# Patient Record
Sex: FEMALE | Race: WHITE | NOT HISPANIC OR LATINO | URBAN - METROPOLITAN AREA
[De-identification: names, ages, dates, MRNs, and addresses within clinical notes are randomized per-mention and may not be internally consistent; named-entity substitution may affect disease eponyms.]

---

## 2017-03-14 ENCOUNTER — APPOINTMENT (RX ONLY)
Dept: URBAN - METROPOLITAN AREA CLINIC 304 | Facility: CLINIC | Age: 72
Setting detail: DERMATOLOGY
End: 2017-03-14

## 2017-03-14 DIAGNOSIS — Z41.9 ENCOUNTER FOR PROCEDURE FOR PURPOSES OTHER THAN REMEDYING HEALTH STATE, UNSPECIFIED: ICD-10-CM

## 2017-03-14 PROCEDURE — ? FILLERS

## 2017-03-14 PROCEDURE — ? HYALURONIDASE INJECTION

## 2017-03-14 ASSESSMENT — LOCATION DETAILED DESCRIPTION DERM
LOCATION DETAILED: RIGHT CENTRAL BUCCAL CHEEK
LOCATION DETAILED: RIGHT MEDIAL BUCCAL CHEEK
LOCATION DETAILED: LEFT SUPERIOR CENTRAL BUCCAL CHEEK

## 2017-03-14 ASSESSMENT — LOCATION SIMPLE DESCRIPTION DERM
LOCATION SIMPLE: LEFT CHEEK
LOCATION SIMPLE: RIGHT CHEEK

## 2017-03-14 ASSESSMENT — LOCATION ZONE DERM: LOCATION ZONE: FACE

## 2017-03-14 NOTE — PROCEDURE: HYALURONIDASE INJECTION
Hyaluronidase Preparation: hyaluronidase
Consent: The risks of contour defects and dimpling of the skin were reviewed with the patient prior to the injection.
Treatment Number (Optional): 1
Filler Previously Used (Optional): Restylane Lyft
Administered By (Optional): DAWNA
Total Volume (Ccs): 0.5
Detail Level: Detailed

## 2017-04-04 ENCOUNTER — APPOINTMENT (RX ONLY)
Dept: URBAN - METROPOLITAN AREA CLINIC 304 | Facility: CLINIC | Age: 72
Setting detail: DERMATOLOGY
End: 2017-04-04

## 2017-04-04 DIAGNOSIS — Z41.9 ENCOUNTER FOR PROCEDURE FOR PURPOSES OTHER THAN REMEDYING HEALTH STATE, UNSPECIFIED: ICD-10-CM

## 2017-04-04 PROCEDURE — ? OTHER (COSMETIC)

## 2017-04-04 ASSESSMENT — LOCATION DETAILED DESCRIPTION DERM
LOCATION DETAILED: LEFT SUPERIOR MEDIAL BUCCAL CHEEK
LOCATION DETAILED: LEFT MEDIAL BUCCAL CHEEK
LOCATION DETAILED: RIGHT MEDIAL BUCCAL CHEEK

## 2017-04-04 ASSESSMENT — LOCATION SIMPLE DESCRIPTION DERM
LOCATION SIMPLE: LEFT CHEEK
LOCATION SIMPLE: RIGHT CHEEK

## 2017-04-04 ASSESSMENT — LOCATION ZONE DERM: LOCATION ZONE: FACE

## 2017-06-15 ENCOUNTER — APPOINTMENT (RX ONLY)
Dept: URBAN - METROPOLITAN AREA CLINIC 304 | Facility: CLINIC | Age: 72
Setting detail: DERMATOLOGY
End: 2017-06-15

## 2017-06-15 DIAGNOSIS — Z41.9 ENCOUNTER FOR PROCEDURE FOR PURPOSES OTHER THAN REMEDYING HEALTH STATE, UNSPECIFIED: ICD-10-CM

## 2017-06-15 PROCEDURE — ? OTHER (COSMETIC)

## 2017-06-15 ASSESSMENT — LOCATION DETAILED DESCRIPTION DERM
LOCATION DETAILED: LEFT SUPERIOR CENTRAL BUCCAL CHEEK
LOCATION DETAILED: RIGHT SUPERIOR MEDIAL BUCCAL CHEEK
LOCATION DETAILED: LEFT CENTRAL BUCCAL CHEEK

## 2017-06-15 ASSESSMENT — LOCATION SIMPLE DESCRIPTION DERM
LOCATION SIMPLE: RIGHT CHEEK
LOCATION SIMPLE: LEFT CHEEK

## 2017-06-15 ASSESSMENT — LOCATION ZONE DERM: LOCATION ZONE: FACE

## 2017-06-15 NOTE — PROCEDURE: OTHER (COSMETIC)
Detail Level: Simple
Other (Free Text): Used 0.15cc or her filler that she had remaining. Still has 0.4cc

## 2017-08-15 ENCOUNTER — APPOINTMENT (RX ONLY)
Dept: URBAN - METROPOLITAN AREA CLINIC 304 | Facility: CLINIC | Age: 72
Setting detail: DERMATOLOGY
End: 2017-08-15

## 2017-08-15 DIAGNOSIS — Z41.9 ENCOUNTER FOR PROCEDURE FOR PURPOSES OTHER THAN REMEDYING HEALTH STATE, UNSPECIFIED: ICD-10-CM

## 2017-08-15 PROCEDURE — ? OTHER (COSMETIC)

## 2017-08-15 ASSESSMENT — LOCATION ZONE DERM
LOCATION ZONE: LIP
LOCATION ZONE: FACE

## 2017-08-15 ASSESSMENT — LOCATION DETAILED DESCRIPTION DERM
LOCATION DETAILED: LEFT LOWER CUTANEOUS LIP
LOCATION DETAILED: LEFT SUPERIOR CENTRAL BUCCAL CHEEK

## 2017-08-15 ASSESSMENT — LOCATION SIMPLE DESCRIPTION DERM
LOCATION SIMPLE: LEFT LIP
LOCATION SIMPLE: LEFT CHEEK

## 2017-10-03 ENCOUNTER — APPOINTMENT (RX ONLY)
Dept: URBAN - METROPOLITAN AREA CLINIC 304 | Facility: CLINIC | Age: 72
Setting detail: DERMATOLOGY
End: 2017-10-03

## 2017-10-03 DIAGNOSIS — Z41.9 ENCOUNTER FOR PROCEDURE FOR PURPOSES OTHER THAN REMEDYING HEALTH STATE, UNSPECIFIED: ICD-10-CM

## 2017-10-03 PROCEDURE — ? FILLERS

## 2017-10-03 PROCEDURE — ? HYALURONIDASE INJECTION

## 2017-10-03 ASSESSMENT — LOCATION DETAILED DESCRIPTION DERM
LOCATION DETAILED: LEFT SUPERIOR CENTRAL BUCCAL CHEEK
LOCATION DETAILED: LEFT CENTRAL BUCCAL CHEEK
LOCATION DETAILED: RIGHT MEDIAL BUCCAL CHEEK

## 2017-10-03 ASSESSMENT — LOCATION SIMPLE DESCRIPTION DERM
LOCATION SIMPLE: RIGHT CHEEK
LOCATION SIMPLE: LEFT CHEEK

## 2017-10-03 ASSESSMENT — LOCATION ZONE DERM: LOCATION ZONE: FACE

## 2017-10-03 NOTE — PROCEDURE: FILLERS
Price (Use Numbers Only, No Special Characters Or $): 367 Price (Use Numbers Only, No Special Characters Or $): 454

## 2017-10-03 NOTE — PROCEDURE: HYALURONIDASE INJECTION
Filler Previously Used (Optional): juvederm ultra xc
Total Volume (Ccs): 0.1
Detail Level: Detailed
Treatment Number (Optional): 1
Administered By (Optional): DAWNA
Hyaluronidase Preparation: hyaluronidase
Consent: The risks of contour defects and dimpling of the skin were reviewed with the patient prior to the injection.

## 2018-01-02 ENCOUNTER — APPOINTMENT (RX ONLY)
Dept: URBAN - METROPOLITAN AREA CLINIC 304 | Facility: CLINIC | Age: 73
Setting detail: DERMATOLOGY
End: 2018-01-02

## 2018-01-02 DIAGNOSIS — Z41.9 ENCOUNTER FOR PROCEDURE FOR PURPOSES OTHER THAN REMEDYING HEALTH STATE, UNSPECIFIED: ICD-10-CM

## 2018-01-02 PROCEDURE — ? FILLERS

## 2018-01-02 ASSESSMENT — LOCATION DETAILED DESCRIPTION DERM
LOCATION DETAILED: LEFT SUPERIOR CENTRAL BUCCAL CHEEK
LOCATION DETAILED: RIGHT CENTRAL BUCCAL CHEEK
LOCATION DETAILED: RIGHT MEDIAL BUCCAL CHEEK
LOCATION DETAILED: LEFT CENTRAL BUCCAL CHEEK

## 2018-01-02 ASSESSMENT — LOCATION ZONE DERM: LOCATION ZONE: FACE

## 2018-01-02 ASSESSMENT — LOCATION SIMPLE DESCRIPTION DERM
LOCATION SIMPLE: RIGHT CHEEK
LOCATION SIMPLE: LEFT CHEEK

## 2018-03-20 ENCOUNTER — APPOINTMENT (RX ONLY)
Dept: URBAN - METROPOLITAN AREA CLINIC 304 | Facility: CLINIC | Age: 73
Setting detail: DERMATOLOGY
End: 2018-03-20

## 2018-03-20 DIAGNOSIS — Z41.9 ENCOUNTER FOR PROCEDURE FOR PURPOSES OTHER THAN REMEDYING HEALTH STATE, UNSPECIFIED: ICD-10-CM

## 2018-03-20 PROCEDURE — ? OTHER (COSMETIC)

## 2018-03-20 ASSESSMENT — LOCATION DETAILED DESCRIPTION DERM
LOCATION DETAILED: LEFT SUPERIOR CENTRAL BUCCAL CHEEK
LOCATION DETAILED: LEFT CENTRAL BUCCAL CHEEK
LOCATION DETAILED: RIGHT CENTRAL BUCCAL CHEEK

## 2018-03-20 ASSESSMENT — LOCATION SIMPLE DESCRIPTION DERM
LOCATION SIMPLE: LEFT CHEEK
LOCATION SIMPLE: RIGHT CHEEK

## 2018-03-20 ASSESSMENT — LOCATION ZONE DERM: LOCATION ZONE: FACE

## 2018-03-20 NOTE — PROCEDURE: OTHER (COSMETIC)
Detail Level: Simple
Other (Free Text): Used 0.4cc of her old juvederm. Samples of ovace foam to use qhs as short contact txmt for rosacea.

## 2018-06-26 ENCOUNTER — APPOINTMENT (RX ONLY)
Dept: URBAN - METROPOLITAN AREA CLINIC 304 | Facility: CLINIC | Age: 73
Setting detail: DERMATOLOGY
End: 2018-06-26

## 2018-06-26 DIAGNOSIS — Z41.9 ENCOUNTER FOR PROCEDURE FOR PURPOSES OTHER THAN REMEDYING HEALTH STATE, UNSPECIFIED: ICD-10-CM

## 2018-06-26 PROCEDURE — ? OTHER

## 2018-06-26 ASSESSMENT — LOCATION ZONE DERM: LOCATION ZONE: FACE

## 2018-06-26 ASSESSMENT — LOCATION SIMPLE DESCRIPTION DERM
LOCATION SIMPLE: LEFT CHEEK
LOCATION SIMPLE: RIGHT CHEEK

## 2018-06-26 ASSESSMENT — LOCATION DETAILED DESCRIPTION DERM
LOCATION DETAILED: LEFT CENTRAL BUCCAL CHEEK
LOCATION DETAILED: LEFT MEDIAL BUCCAL CHEEK
LOCATION DETAILED: RIGHT CENTRAL BUCCAL CHEEK

## 2019-01-08 ENCOUNTER — APPOINTMENT (RX ONLY)
Dept: URBAN - METROPOLITAN AREA CLINIC 304 | Facility: CLINIC | Age: 74
Setting detail: DERMATOLOGY
End: 2019-01-08

## 2019-01-08 DIAGNOSIS — Z41.9 ENCOUNTER FOR PROCEDURE FOR PURPOSES OTHER THAN REMEDYING HEALTH STATE, UNSPECIFIED: ICD-10-CM

## 2019-01-08 PROCEDURE — ? OTHER (COSMETIC)

## 2019-01-08 ASSESSMENT — LOCATION DETAILED DESCRIPTION DERM: LOCATION DETAILED: LEFT CENTRAL BUCCAL CHEEK

## 2019-01-08 ASSESSMENT — LOCATION ZONE DERM: LOCATION ZONE: FACE

## 2019-01-08 ASSESSMENT — LOCATION SIMPLE DESCRIPTION DERM: LOCATION SIMPLE: LEFT CHEEK

## 2019-01-08 NOTE — PROCEDURE: OTHER (COSMETIC)
Detail Level: Simple
Price (Use Numbers Only, No Special Characters Or $): 50
Other (Free Text): Used 0.1cc of her juvederm (has 0.3cc remaining)

## 2019-10-22 ENCOUNTER — APPOINTMENT (RX ONLY)
Dept: URBAN - METROPOLITAN AREA CLINIC 304 | Facility: CLINIC | Age: 74
Setting detail: DERMATOLOGY
End: 2019-10-22

## 2019-10-22 DIAGNOSIS — Z41.9 ENCOUNTER FOR PROCEDURE FOR PURPOSES OTHER THAN REMEDYING HEALTH STATE, UNSPECIFIED: ICD-10-CM

## 2019-10-22 PROCEDURE — ? OTHER (COSMETIC)

## 2019-10-22 ASSESSMENT — LOCATION SIMPLE DESCRIPTION DERM
LOCATION SIMPLE: LEFT CHEEK
LOCATION SIMPLE: RIGHT CHEEK

## 2019-10-22 ASSESSMENT — LOCATION DETAILED DESCRIPTION DERM
LOCATION DETAILED: RIGHT CENTRAL BUCCAL CHEEK
LOCATION DETAILED: LEFT CENTRAL BUCCAL CHEEK

## 2019-10-22 ASSESSMENT — LOCATION ZONE DERM: LOCATION ZONE: FACE

## 2019-10-22 NOTE — PROCEDURE: OTHER (COSMETIC)
Detail Level: Zone
Price (Use Numbers Only, No Special Characters Or $): 50
Other (Free Text): Used her remaining 0.3cc juvederm ultra

## 2021-07-19 ENCOUNTER — APPOINTMENT (RX ONLY)
Dept: URBAN - METROPOLITAN AREA CLINIC 304 | Facility: CLINIC | Age: 76
Setting detail: DERMATOLOGY
End: 2021-07-19

## 2021-07-19 DIAGNOSIS — L90.5 SCAR CONDITIONS AND FIBROSIS OF SKIN: ICD-10-CM

## 2021-07-19 PROCEDURE — 99212 OFFICE O/P EST SF 10 MIN: CPT

## 2021-07-19 PROCEDURE — ? COUNSELING

## 2021-07-19 ASSESSMENT — LOCATION DETAILED DESCRIPTION DERM: LOCATION DETAILED: RIGHT MEDIAL BUCCAL CHEEK

## 2021-07-19 ASSESSMENT — LOCATION SIMPLE DESCRIPTION DERM: LOCATION SIMPLE: RIGHT CHEEK

## 2021-07-19 ASSESSMENT — LOCATION ZONE DERM: LOCATION ZONE: FACE

## 2022-03-08 ENCOUNTER — APPOINTMENT (RX ONLY)
Dept: URBAN - METROPOLITAN AREA CLINIC 304 | Facility: CLINIC | Age: 77
Setting detail: DERMATOLOGY
End: 2022-03-08

## 2022-03-08 DIAGNOSIS — D485 NEOPLASM OF UNCERTAIN BEHAVIOR OF SKIN: ICD-10-CM

## 2022-03-08 PROBLEM — D48.5 NEOPLASM OF UNCERTAIN BEHAVIOR OF SKIN: Status: ACTIVE | Noted: 2022-03-08

## 2022-03-08 PROCEDURE — 11104 PUNCH BX SKIN SINGLE LESION: CPT

## 2022-03-08 PROCEDURE — ? BIOPSY BY PUNCH METHOD

## 2022-03-08 ASSESSMENT — LOCATION SIMPLE DESCRIPTION DERM: LOCATION SIMPLE: RIGHT CHEEK

## 2022-03-08 ASSESSMENT — LOCATION ZONE DERM: LOCATION ZONE: FACE

## 2022-03-08 ASSESSMENT — LOCATION DETAILED DESCRIPTION DERM: LOCATION DETAILED: RIGHT MEDIAL BUCCAL CHEEK

## 2022-03-08 NOTE — PROCEDURE: BIOPSY BY PUNCH METHOD
Detail Level: Detailed
Was A Bandage Applied: Yes
Punch Size In Mm: 4
Biopsy Type: H and E
Anesthesia Type: 2% lidocaine with epinephrine and a 1:10 solution of 8.4% sodium bicarbonate
Anesthesia Volume In Cc (Will Not Render If 0): 1.5
Additional Anesthesia Volume In Cc (Will Not Render If 0): 0
Hemostasis: None
Epidermal Sutures: 5-0 Ethilon
Number Of Epidermal Sutures (Optional): 2
Wound Care: Aquaphor
Dressing: bandage
Suture Removal: 14 days
Patient Will Remove Sutures At Home?: No
Lab: 6
Home Suture Removal Text: Patient was provided a home suture removal kit and will remove their sutures at home.  If they have any questions or difficulties they will call the office.
Billing Type: Third-Party Bill
Information: Selecting Yes will display possible errors in your note based on the variables you have selected. This validation is only offered as a suggestion for you. PLEASE NOTE THAT THE VALIDATION TEXT WILL BE REMOVED WHEN YOU FINALIZE YOUR NOTE. IF YOU WANT TO FAX A PRELIMINARY NOTE YOU WILL NEED TO TOGGLE THIS TO 'NO' IF YOU DO NOT WANT IT IN YOUR FAXED NOTE.

## 2022-03-15 ENCOUNTER — APPOINTMENT (RX ONLY)
Dept: URBAN - METROPOLITAN AREA CLINIC 304 | Facility: CLINIC | Age: 77
Setting detail: DERMATOLOGY
End: 2022-03-15

## 2022-03-15 DIAGNOSIS — Z48.02 ENCOUNTER FOR REMOVAL OF SUTURES: ICD-10-CM

## 2022-03-15 PROCEDURE — 99024 POSTOP FOLLOW-UP VISIT: CPT

## 2022-03-15 PROCEDURE — ? SUTURE REMOVAL (GLOBAL PERIOD)

## 2022-03-15 ASSESSMENT — LOCATION SIMPLE DESCRIPTION DERM: LOCATION SIMPLE: RIGHT CHEEK

## 2022-03-15 ASSESSMENT — LOCATION ZONE DERM: LOCATION ZONE: FACE

## 2022-03-15 ASSESSMENT — LOCATION DETAILED DESCRIPTION DERM: LOCATION DETAILED: RIGHT MEDIAL BUCCAL CHEEK

## 2022-03-15 NOTE — PROCEDURE: SUTURE REMOVAL (GLOBAL PERIOD)
Detail Level: Detailed
Add 76983 Cpt? (Important Note: In 2017 The Use Of 01513 Is Being Tracked By Cms To Determine Future Global Period Reimbursement For Global Periods): yes

## 2022-07-29 NOTE — PROCEDURE: OTHER (COSMETIC)
Date last seen: May 9, 2022 for a medication check  Date of next visit: none scheduled    Medication Requested:    Outpatient Current Medications as of as of 7/29/2022       Disp Refills Start End    amphetamine-dextroamphetamine (Adderall XR) 20 MG 24 hr capsule 30 capsule 0 6/30/2022 7/30/2022    Sig - Route: Take 1 capsule by mouth every morning. - Oral    Class: Eprescribe    Earliest Fill Date: 6/30/2022    No prior authorization was found for this prescription.    Found prior authorization for another prescription for the same medication: Approved    amphetamine-dextroamphetamine (Adderall) 10 MG tablet 60 tablet 0 6/30/2022 7/30/2022    Sig - Route: Take 1 tablet by mouth 2 times daily. - Oral    Class: Eprescribe    Earliest Fill Date: 6/30/2022    No prior authorization was found for this prescription.    Found prior authorization for another prescription for the same medication: Approved     PDMP information in blue folder on Dr. Deluna's desk to review before prescription authorized.    
Detail Level: Simple
Other (Free Text): Used 0.05cc of her juvederm to left side only. Pt has small filler nodule at oral commissure on left side that is unhappy with. Will wait until early october and then ? Hyaluronidase use for dissolving purposes. Then can f/u for filler appt in nov/dec to correct if pt desires.

## 2022-09-12 ENCOUNTER — APPOINTMENT (RX ONLY)
Dept: URBAN - METROPOLITAN AREA CLINIC 304 | Facility: CLINIC | Age: 77
Setting detail: DERMATOLOGY
End: 2022-09-12

## 2022-09-12 DIAGNOSIS — Z41.9 ENCOUNTER FOR PROCEDURE FOR PURPOSES OTHER THAN REMEDYING HEALTH STATE, UNSPECIFIED: ICD-10-CM

## 2022-09-12 PROCEDURE — ? FILLERS

## 2022-09-12 ASSESSMENT — LOCATION ZONE DERM: LOCATION ZONE: FACE

## 2022-09-12 ASSESSMENT — LOCATION DETAILED DESCRIPTION DERM
LOCATION DETAILED: LEFT CENTRAL BUCCAL CHEEK
LOCATION DETAILED: RIGHT MEDIAL BUCCAL CHEEK
LOCATION DETAILED: RIGHT CENTRAL BUCCAL CHEEK

## 2022-09-12 ASSESSMENT — LOCATION SIMPLE DESCRIPTION DERM
LOCATION SIMPLE: RIGHT CHEEK
LOCATION SIMPLE: LEFT CHEEK

## 2022-09-12 NOTE — PROCEDURE: FILLERS
Nasolabial Folds Filler Volume In Cc: 0
Additional Area 1 Location: chin
Include Cannula Size?: 27G
Include Documentation That Aspiration Was Performed Prior To Injecting Filler:: No
Additional Area 2 Location: oral commissures
Filler: Restylane Defyne
Additional Area 3 Location: central eyebrows
Include Cannula Length?: 1.5 inch
Aspiration Statement: Aspiration was performed prior to injecting site with filler.
Include Cannula Brand?: DermaSculpt
Additional Area 4 Location: ear lobes
Detail Level: Detailed
Consent: Written consent obtained. Risks include but not limited to bruising, beading, irregular texture, ulceration, infection, allergic reaction, scar formation, incomplete augmentation, temporary nature, procedural pain.
Filler Comments: Pt has 0.4cc remaining. Will he $50/visit for use of remaining\\nUsed more on right vs left
Marionette Lines Filler  Volume In Cc: 0.6
Price (Use Numbers Only, No Special Characters Or $): 500
Anesthesia Volume In Cc: 0.5
Post-Care Instructions: Patient instructed to apply ice to reduce swelling.
Additional Anesthesia Volume In Cc: 6
Map Statment: See Attach Map for Complete Details

## 2022-12-12 ENCOUNTER — APPOINTMENT (RX ONLY)
Dept: URBAN - METROPOLITAN AREA CLINIC 304 | Facility: CLINIC | Age: 77
Setting detail: DERMATOLOGY
End: 2022-12-12

## 2022-12-12 DIAGNOSIS — Z41.9 ENCOUNTER FOR PROCEDURE FOR PURPOSES OTHER THAN REMEDYING HEALTH STATE, UNSPECIFIED: ICD-10-CM

## 2022-12-12 PROCEDURE — ? FILLERS

## 2022-12-12 ASSESSMENT — LOCATION SIMPLE DESCRIPTION DERM
LOCATION SIMPLE: LEFT CHEEK
LOCATION SIMPLE: RIGHT CHEEK

## 2022-12-12 ASSESSMENT — LOCATION DETAILED DESCRIPTION DERM
LOCATION DETAILED: RIGHT CENTRAL BUCCAL CHEEK
LOCATION DETAILED: LEFT MEDIAL BUCCAL CHEEK

## 2022-12-12 ASSESSMENT — LOCATION ZONE DERM: LOCATION ZONE: FACE

## 2022-12-12 NOTE — PROCEDURE: FILLERS
Mid Face Filler  Volume In Cc: 0
Price (Use Numbers Only, No Special Characters Or $): 500
Marionette Lines Filler  Volume In Cc: 0.6
Use Map Statement For Sites (Optional): No
Filler Comments: Used her remaining 0.4cc and additional from new syringe today. Pt has 0.45cc remaining from today’s treatment
Include Cannula Size?: 27G
Anesthesia Volume In Cc: 0.5
Map Statment: See Attach Map for Complete Details
Consent: Written consent obtained. Risks include but not limited to bruising, beading, irregular texture, ulceration, infection, allergic reaction, scar formation, incomplete augmentation, temporary nature, procedural pain.
Include Cannula Length?: 1.5 inch
Post-Care Instructions: Patient instructed to apply ice to reduce swelling.
Additional Anesthesia Volume In Cc: 6
Include Cannula Brand?: DermaSculpt
Additional Area 1 Location: chin
Aspiration Statement: Aspiration was performed prior to injecting site with filler.
Additional Area 2 Location: oral commissures
Filler: Restylane Defyne
Additional Area 3 Location: central eyebrows
Additional Area 4 Location: ear lobes
Detail Level: Detailed

## 2023-07-13 ENCOUNTER — APPOINTMENT (RX ONLY)
Dept: URBAN - METROPOLITAN AREA CLINIC 304 | Facility: CLINIC | Age: 78
Setting detail: DERMATOLOGY
End: 2023-07-13

## 2023-07-13 DIAGNOSIS — Z41.9 ENCOUNTER FOR PROCEDURE FOR PURPOSES OTHER THAN REMEDYING HEALTH STATE, UNSPECIFIED: ICD-10-CM

## 2023-07-13 PROCEDURE — ? OTHER (COSMETIC)

## 2023-07-13 ASSESSMENT — LOCATION SIMPLE DESCRIPTION DERM
LOCATION SIMPLE: LEFT CHEEK
LOCATION SIMPLE: RIGHT CHEEK
LOCATION SIMPLE: RIGHT LIP

## 2023-07-13 ASSESSMENT — LOCATION ZONE DERM
LOCATION ZONE: FACE
LOCATION ZONE: LIP

## 2023-07-13 ASSESSMENT — LOCATION DETAILED DESCRIPTION DERM
LOCATION DETAILED: RIGHT ORAL COMMISSURE
LOCATION DETAILED: RIGHT CENTRAL BUCCAL CHEEK
LOCATION DETAILED: LEFT MEDIAL BUCCAL CHEEK
LOCATION DETAILED: LEFT SUPERIOR CENTRAL BUCCAL CHEEK

## 2023-07-13 NOTE — PROCEDURE: OTHER (COSMETIC)
Other (Free Text): Used her filler provided by rep (javy santana). Has 0.45cc remaining. Will RTC in December.
Price (Use Numbers Only, No Special Characters Or $): 50
Detail Level: Zone

## 2023-12-08 ENCOUNTER — APPOINTMENT (RX ONLY)
Dept: URBAN - METROPOLITAN AREA CLINIC 304 | Facility: CLINIC | Age: 78
Setting detail: DERMATOLOGY
End: 2023-12-08

## 2023-12-08 DIAGNOSIS — Z41.9 ENCOUNTER FOR PROCEDURE FOR PURPOSES OTHER THAN REMEDYING HEALTH STATE, UNSPECIFIED: ICD-10-CM

## 2023-12-08 PROCEDURE — ? OTHER

## 2023-12-08 NOTE — PROCEDURE: OTHER
Render Risk Assessment In Note?: no
Detail Level: Simple
Other (Free Text): Pt had remaining filler left from this summer. Used 0.45cc to oral commissures and left marionette line.
Note Text (......Xxx Chief Complaint.): This diagnosis correlates with the

## 2024-04-01 ENCOUNTER — APPOINTMENT (RX ONLY)
Dept: URBAN - METROPOLITAN AREA CLINIC 304 | Facility: CLINIC | Age: 79
Setting detail: DERMATOLOGY
End: 2024-04-01

## 2024-04-01 DIAGNOSIS — Z41.9 ENCOUNTER FOR PROCEDURE FOR PURPOSES OTHER THAN REMEDYING HEALTH STATE, UNSPECIFIED: ICD-10-CM

## 2024-04-01 PROCEDURE — ? OTHER (COSMETIC)

## 2024-04-01 ASSESSMENT — LOCATION DETAILED DESCRIPTION DERM
LOCATION DETAILED: RIGHT LATERAL BUCCAL CHEEK
LOCATION DETAILED: LEFT MEDIAL BUCCAL CHEEK
LOCATION DETAILED: LEFT SUPERIOR LATERAL BUCCAL CHEEK
LOCATION DETAILED: RIGHT CENTRAL BUCCAL CHEEK
LOCATION DETAILED: LEFT CENTRAL BUCCAL CHEEK

## 2024-04-01 ASSESSMENT — LOCATION SIMPLE DESCRIPTION DERM
LOCATION SIMPLE: LEFT CHEEK
LOCATION SIMPLE: RIGHT CHEEK

## 2024-04-01 ASSESSMENT — LOCATION ZONE DERM: LOCATION ZONE: FACE

## 2024-04-01 NOTE — PROCEDURE: OTHER (COSMETIC)
Price (Use Numbers Only, No Special Characters Or $): 50
Other (Free Text): Discussed filler for marionette lines at next appt. Has used restylane defyne in past
Detail Level: Zone

## 2024-06-14 ENCOUNTER — APPOINTMENT (RX ONLY)
Dept: URBAN - METROPOLITAN AREA CLINIC 304 | Facility: CLINIC | Age: 79
Setting detail: DERMATOLOGY
End: 2024-06-14

## 2024-06-14 DIAGNOSIS — Z41.9 ENCOUNTER FOR PROCEDURE FOR PURPOSES OTHER THAN REMEDYING HEALTH STATE, UNSPECIFIED: ICD-10-CM

## 2024-06-14 PROCEDURE — ? COSMETIC CONSULTATION - MICRO-NEEDLING

## 2024-06-14 PROCEDURE — ? COSMETIC CONSULTATION: COOLPEEL

## 2024-06-14 PROCEDURE — ? FILLERS

## 2024-06-14 ASSESSMENT — LOCATION DETAILED DESCRIPTION DERM
LOCATION DETAILED: RIGHT UPPER CUTANEOUS LIP
LOCATION DETAILED: LEFT CHIN
LOCATION DETAILED: LEFT CENTRAL BUCCAL CHEEK
LOCATION DETAILED: LEFT UPPER CUTANEOUS LIP

## 2024-06-14 ASSESSMENT — LOCATION SIMPLE DESCRIPTION DERM
LOCATION SIMPLE: LEFT LIP
LOCATION SIMPLE: RIGHT LIP
LOCATION SIMPLE: LEFT CHEEK
LOCATION SIMPLE: CHIN

## 2024-06-14 ASSESSMENT — LOCATION ZONE DERM
LOCATION ZONE: FACE
LOCATION ZONE: FACE
LOCATION ZONE: LIP

## 2024-06-14 NOTE — PROCEDURE: FILLERS
Lateral Face Filler Volume In Cc: 0
Include Cannula Size?: 27G
Additional Anesthesia Volume In Cc: 6
Map Statment: See Attach Map for Complete Details
Include Cannula Information In Note?: No
Additional Area 1 Location: chin
Include Cannula Length?: 1.5 inch
Include Cannula Brand?: DermaSculpt
Additional Area 2 Location: oral commissures
Aspiration Statement: Aspiration was performed prior to injecting site with filler.
Additional Area 3 Location: central eyebrows
Filler: Restylane Refyne
Filler Comments: Pt has 0.7cc remaining.
Additional Area 4 Location: ear lobes
Consent: Written consent obtained. Risks include but not limited to bruising, beading, irregular texture, ulceration, infection, allergic reaction, scar formation, incomplete augmentation, temporary nature, procedural pain.
Post-Care Instructions: Patient instructed to apply ice to reduce swelling.
Detail Level: Detailed
Marionette Lines Filler Volume In Cc: 0.3
Price (Use Numbers Only, No Special Characters Or $): 510
Anesthesia Volume In Cc: 0.5

## 2024-09-13 ENCOUNTER — APPOINTMENT (RX ONLY)
Dept: URBAN - METROPOLITAN AREA CLINIC 304 | Facility: CLINIC | Age: 79
Setting detail: DERMATOLOGY
End: 2024-09-13

## 2024-09-13 DIAGNOSIS — Z41.9 ENCOUNTER FOR PROCEDURE FOR PURPOSES OTHER THAN REMEDYING HEALTH STATE, UNSPECIFIED: ICD-10-CM

## 2024-09-13 PROCEDURE — ? FILLERS

## 2024-09-13 ASSESSMENT — LOCATION SIMPLE DESCRIPTION DERM: LOCATION SIMPLE: LEFT CHEEK

## 2024-09-13 ASSESSMENT — LOCATION ZONE DERM: LOCATION ZONE: FACE

## 2024-09-13 ASSESSMENT — LOCATION DETAILED DESCRIPTION DERM
LOCATION DETAILED: LEFT MEDIAL BUCCAL CHEEK
LOCATION DETAILED: LEFT SUPERIOR CENTRAL BUCCAL CHEEK

## 2024-09-13 NOTE — PROCEDURE: FILLERS
Lateral Face Filler Volume In Cc: 0
Include Cannula Size?: 27G
Additional Anesthesia Volume In Cc: 6
Map Statment: See Attach Map for Complete Details
Include Cannula Information In Note?: No
Additional Area 1 Location: chin
Include Cannula Length?: 1.5 inch
Include Cannula Brand?: DermaSculpt
Additional Area 2 Location: oral commissures
Aspiration Statement: Aspiration was performed prior to injecting site with filler.
Additional Area 3 Location: central eyebrows
Filler: Restylane Refyne
Filler Comments: Pt has 0.35cc remaining.
Additional Area 4 Location: ear lobes
Consent: Written consent obtained. Risks include but not limited to bruising, beading, irregular texture, ulceration, infection, allergic reaction, scar formation, incomplete augmentation, temporary nature, procedural pain.
Post-Care Instructions: Patient instructed to apply ice to reduce swelling.
Detail Level: Detailed
Marionette Lines Filler Volume In Cc: 0.3
Anesthesia Volume In Cc: 0.5

## 2024-12-02 ENCOUNTER — APPOINTMENT (RX ONLY)
Dept: URBAN - METROPOLITAN AREA CLINIC 304 | Facility: CLINIC | Age: 79
Setting detail: DERMATOLOGY
End: 2024-12-02

## 2024-12-02 DIAGNOSIS — Z41.9 ENCOUNTER FOR PROCEDURE FOR PURPOSES OTHER THAN REMEDYING HEALTH STATE, UNSPECIFIED: ICD-10-CM

## 2024-12-02 PROCEDURE — ? OTHER

## 2024-12-02 ASSESSMENT — LOCATION ZONE DERM: LOCATION ZONE: FACE

## 2024-12-02 ASSESSMENT — LOCATION DETAILED DESCRIPTION DERM: LOCATION DETAILED: LEFT CENTRAL BUCCAL CHEEK

## 2024-12-02 ASSESSMENT — LOCATION SIMPLE DESCRIPTION DERM: LOCATION SIMPLE: LEFT CHEEK

## 2024-12-02 NOTE — PROCEDURE: OTHER
Other (Free Text): Patient had 0.3 cc remaining of old filler that we used today. Had a sample of juvederm vollure given by rep that also used and she still has 0.7cc remaining of that.
Render Risk Assessment In Note?: no
Detail Level: Simple
Note Text (......Xxx Chief Complaint.): This diagnosis correlates with the